# Patient Record
Sex: FEMALE | Race: OTHER | HISPANIC OR LATINO | URBAN - METROPOLITAN AREA
[De-identification: names, ages, dates, MRNs, and addresses within clinical notes are randomized per-mention and may not be internally consistent; named-entity substitution may affect disease eponyms.]

---

## 2017-05-21 ENCOUNTER — EMERGENCY (EMERGENCY)
Facility: HOSPITAL | Age: 77
LOS: 1 days | Discharge: HOME | End: 2017-05-21
Attending: EMERGENCY MEDICINE | Admitting: EMERGENCY MEDICINE
Payer: COMMERCIAL

## 2017-05-21 VITALS
TEMPERATURE: 98 F | HEART RATE: 62 BPM | RESPIRATION RATE: 18 BRPM | OXYGEN SATURATION: 98 % | WEIGHT: 142.64 LBS | SYSTOLIC BLOOD PRESSURE: 150 MMHG | DIASTOLIC BLOOD PRESSURE: 82 MMHG

## 2017-05-21 VITALS
RESPIRATION RATE: 20 BRPM | TEMPERATURE: 98 F | OXYGEN SATURATION: 98 % | HEART RATE: 50 BPM | DIASTOLIC BLOOD PRESSURE: 61 MMHG | SYSTOLIC BLOOD PRESSURE: 106 MMHG

## 2017-05-21 DIAGNOSIS — I25.10 ATHEROSCLEROTIC HEART DISEASE OF NATIVE CORONARY ARTERY WITHOUT ANGINA PECTORIS: ICD-10-CM

## 2017-05-21 DIAGNOSIS — Z98.89 OTHER SPECIFIED POSTPROCEDURAL STATES: Chronic | ICD-10-CM

## 2017-05-21 DIAGNOSIS — E78.00 PURE HYPERCHOLESTEROLEMIA, UNSPECIFIED: ICD-10-CM

## 2017-05-21 DIAGNOSIS — R51 HEADACHE: ICD-10-CM

## 2017-05-21 DIAGNOSIS — Z79.82 LONG TERM (CURRENT) USE OF ASPIRIN: ICD-10-CM

## 2017-05-21 DIAGNOSIS — Z79.899 OTHER LONG TERM (CURRENT) DRUG THERAPY: ICD-10-CM

## 2017-05-21 DIAGNOSIS — E03.9 HYPOTHYROIDISM, UNSPECIFIED: ICD-10-CM

## 2017-05-21 DIAGNOSIS — I10 ESSENTIAL (PRIMARY) HYPERTENSION: ICD-10-CM

## 2017-05-21 LAB
ALBUMIN SERPL ELPH-MCNC: 4.3 G/DL — SIGNIFICANT CHANGE UP (ref 3.3–5)
ALP SERPL-CCNC: 59 U/L — SIGNIFICANT CHANGE UP (ref 40–120)
ALT FLD-CCNC: 15 U/L — SIGNIFICANT CHANGE UP (ref 10–45)
ANION GAP SERPL CALC-SCNC: 12 MMOL/L — SIGNIFICANT CHANGE UP (ref 5–17)
AST SERPL-CCNC: 22 U/L — SIGNIFICANT CHANGE UP (ref 10–40)
BASOPHILS NFR BLD AUTO: 0.2 % — SIGNIFICANT CHANGE UP (ref 0–2)
BILIRUB SERPL-MCNC: 0.6 MG/DL — SIGNIFICANT CHANGE UP (ref 0.2–1.2)
BUN SERPL-MCNC: 16 MG/DL — SIGNIFICANT CHANGE UP (ref 7–23)
CALCIUM SERPL-MCNC: 9.6 MG/DL — SIGNIFICANT CHANGE UP (ref 8.4–10.5)
CHLORIDE SERPL-SCNC: 101 MMOL/L — SIGNIFICANT CHANGE UP (ref 96–108)
CO2 SERPL-SCNC: 24 MMOL/L — SIGNIFICANT CHANGE UP (ref 22–31)
CREAT SERPL-MCNC: 0.6 MG/DL — SIGNIFICANT CHANGE UP (ref 0.5–1.3)
EOSINOPHIL NFR BLD AUTO: 2.2 % — SIGNIFICANT CHANGE UP (ref 0–6)
GLUCOSE SERPL-MCNC: 170 MG/DL — HIGH (ref 70–99)
HCT VFR BLD CALC: 42 % — SIGNIFICANT CHANGE UP (ref 34.5–45)
HGB BLD-MCNC: 14.8 G/DL — SIGNIFICANT CHANGE UP (ref 11.5–15.5)
LYMPHOCYTES # BLD AUTO: 28.2 % — SIGNIFICANT CHANGE UP (ref 13–44)
MCHC RBC-ENTMCNC: 33.6 PG — SIGNIFICANT CHANGE UP (ref 27–34)
MCHC RBC-ENTMCNC: 35.2 G/DL — SIGNIFICANT CHANGE UP (ref 32–36)
MCV RBC AUTO: 95.2 FL — SIGNIFICANT CHANGE UP (ref 80–100)
MONOCYTES NFR BLD AUTO: 4 % — SIGNIFICANT CHANGE UP (ref 2–14)
NEUTROPHILS NFR BLD AUTO: 65.4 % — SIGNIFICANT CHANGE UP (ref 43–77)
PLATELET # BLD AUTO: 202 K/UL — SIGNIFICANT CHANGE UP (ref 150–400)
POTASSIUM SERPL-MCNC: 3.7 MMOL/L — SIGNIFICANT CHANGE UP (ref 3.5–5.3)
POTASSIUM SERPL-SCNC: 3.7 MMOL/L — SIGNIFICANT CHANGE UP (ref 3.5–5.3)
PROT SERPL-MCNC: 7.2 G/DL — SIGNIFICANT CHANGE UP (ref 6–8.3)
RBC # BLD: 4.41 M/UL — SIGNIFICANT CHANGE UP (ref 3.8–5.2)
RBC # FLD: 16.2 % — SIGNIFICANT CHANGE UP (ref 10.3–16.9)
SODIUM SERPL-SCNC: 137 MMOL/L — SIGNIFICANT CHANGE UP (ref 135–145)
WBC # BLD: 5.9 K/UL — SIGNIFICANT CHANGE UP (ref 3.8–10.5)
WBC # FLD AUTO: 5.9 K/UL — SIGNIFICANT CHANGE UP (ref 3.8–10.5)

## 2017-05-21 PROCEDURE — 99285 EMERGENCY DEPT VISIT HI MDM: CPT

## 2017-05-21 PROCEDURE — 80053 COMPREHEN METABOLIC PANEL: CPT

## 2017-05-21 PROCEDURE — 36415 COLL VENOUS BLD VENIPUNCTURE: CPT

## 2017-05-21 PROCEDURE — 70450 CT HEAD/BRAIN W/O DYE: CPT

## 2017-05-21 PROCEDURE — 85025 COMPLETE CBC W/AUTO DIFF WBC: CPT

## 2017-05-21 PROCEDURE — 96374 THER/PROPH/DIAG INJ IV PUSH: CPT

## 2017-05-21 PROCEDURE — 99284 EMERGENCY DEPT VISIT MOD MDM: CPT | Mod: 25

## 2017-05-21 PROCEDURE — 70450 CT HEAD/BRAIN W/O DYE: CPT | Mod: 26

## 2017-05-21 RX ORDER — ACETAMINOPHEN 500 MG
650 TABLET ORAL ONCE
Qty: 0 | Refills: 0 | Status: COMPLETED | OUTPATIENT
Start: 2017-05-21 | End: 2017-05-21

## 2017-05-21 RX ORDER — METOCLOPRAMIDE HCL 10 MG
10 TABLET ORAL ONCE
Qty: 0 | Refills: 0 | Status: COMPLETED | OUTPATIENT
Start: 2017-05-21 | End: 2017-05-21

## 2017-05-21 RX ORDER — SODIUM CHLORIDE 9 MG/ML
1000 INJECTION INTRAMUSCULAR; INTRAVENOUS; SUBCUTANEOUS ONCE
Qty: 0 | Refills: 0 | Status: COMPLETED | OUTPATIENT
Start: 2017-05-21 | End: 2017-05-21

## 2017-05-21 RX ADMIN — SODIUM CHLORIDE 2000 MILLILITER(S): 9 INJECTION INTRAMUSCULAR; INTRAVENOUS; SUBCUTANEOUS at 11:41

## 2017-05-21 RX ADMIN — Medication 104 MILLIGRAM(S): at 11:41

## 2017-05-21 RX ADMIN — Medication 650 MILLIGRAM(S): at 11:41

## 2017-05-21 NOTE — ED PROVIDER NOTE - ATTENDING CONTRIBUTION TO CARE
dull headache at occipital portion of head, n o trauma, gradual onset, nl neuro exam, well appearing, supple neck,no temporal tenderness,  no fevers, high suspicion for tension headache however due to age CT was done, no apparent ICH or lesion, pt's headache improved with treatment, stable for discharge.

## 2017-05-21 NOTE — ED PROVIDER NOTE - OBJECTIVE STATEMENT
MO 77 yo f w/ pmh cad, htn, hyperlipidemia, hypothyroidism [place] 75 yo f w/ pmh cad, htn, hyperlipidemia, hypothyroidism presents to ed for headache since yesterday.  Pt describes it as dull achiness to back of rt side of head without any alleviating/worsening factors.  pt states it has gotten progressively worse and that she has not taken anything for pain.  Pt has had similar headaches in the past.  Pt otherwise denies worst headache of her life, lightheadedness, dizziness, changes in vision, fevers, chills

## 2017-05-21 NOTE — ED ADULT NURSE NOTE - PMH
CAD (coronary artery disease)    Glaucoma    Herniated disc, cervical    HLD (hyperlipidemia)    HTN (hypertension)    Hypothyroid

## 2017-05-21 NOTE — ED ADULT TRIAGE NOTE - ARRIVAL INFO ADDITIONAL COMMENTS
Pt presented to ED with occipital headache since last night. Pt denies vomiting, dizziness, vision change, dizziness, chest pain, SOB, fever, cough. Pt is A&O x 3 and able to speak coherently in full sentences, all extremities are equally strong, no facial droop, no arm drift.

## 2017-05-21 NOTE — ED PROVIDER NOTE - MEDICAL DECISION MAKING DETAILS
Case d/w dr. Esquivel, [place] 75 yo f w/ pmh cad, htn, hyperlipidemia, hypothyroidism presents to ed for headache since yesterday.  Pt describes it as dull achiness to back of rt side of head without any alleviating/worsening factors. Pt with nonfocal neuro exam, pt likely with tension headache.  Ct head obtained and normal, basic labs checked and normal.  Pt treated with ivf's, reglan and tylenol, pt reassessed and reports feeling much improved, pt stable for discharge

## 2020-02-19 ENCOUNTER — EMERGENCY (EMERGENCY)
Facility: HOSPITAL | Age: 80
LOS: 1 days | Discharge: HOME | End: 2020-02-19
Attending: EMERGENCY MEDICINE | Admitting: EMERGENCY MEDICINE
Payer: COMMERCIAL

## 2020-02-19 VITALS
WEIGHT: 139.99 LBS | TEMPERATURE: 98 F | OXYGEN SATURATION: 96 % | DIASTOLIC BLOOD PRESSURE: 91 MMHG | SYSTOLIC BLOOD PRESSURE: 166 MMHG | HEART RATE: 66 BPM | RESPIRATION RATE: 18 BRPM | HEIGHT: 62 IN

## 2020-02-19 DIAGNOSIS — Z98.89 OTHER SPECIFIED POSTPROCEDURAL STATES: Chronic | ICD-10-CM

## 2020-02-19 PROCEDURE — 99283 EMERGENCY DEPT VISIT LOW MDM: CPT

## 2020-02-19 PROCEDURE — 99284 EMERGENCY DEPT VISIT MOD MDM: CPT

## 2020-02-19 RX ORDER — DIAZEPAM 5 MG
2 TABLET ORAL ONCE
Refills: 0 | Status: DISCONTINUED | OUTPATIENT
Start: 2020-02-19 | End: 2020-02-19

## 2020-02-19 RX ORDER — DIAZEPAM 5 MG
1 TABLET ORAL
Qty: 9 | Refills: 0
Start: 2020-02-19 | End: 2020-02-21

## 2020-02-19 RX ORDER — ACETAMINOPHEN 500 MG
650 TABLET ORAL ONCE
Refills: 0 | Status: COMPLETED | OUTPATIENT
Start: 2020-02-19 | End: 2020-02-19

## 2020-02-19 RX ADMIN — Medication 650 MILLIGRAM(S): at 17:21

## 2020-02-19 RX ADMIN — Medication 2 MILLIGRAM(S): at 17:21

## 2020-02-19 NOTE — ED PROVIDER NOTE - CLINICAL SUMMARY MEDICAL DECISION MAKING FREE TEXT BOX
79F PMH CAD w/ stent, HTN, HLD, hypothyroid, glaucoma, cervical stenosis p/w pain/dizziness. Pt has pain to L occipital head radiating inferiorly to L cervical region. Also feels intermittent sensation of room spinning. Both of these symptoms have been intermittent for yrs. Follows w/ neuro, but neuro  ~1mo ago so came to ED. Has been taking very sporadic flexeril and baclofen w/ minimal relief. Last MRI c-spine/brain in May 2019 w/ no acute pathology, multilevel severe cervical stenosis. Subsequent neuro note in  recommending continued vestibular exercises. Current symptoms have been present x1w. No current dizziness. Vitals wnl, exam as above.  ddx: Likely 2/2 chronic stenosis/radiculopathy. Clinically no acute intracranial/spinal pathology.   Symptom control, reassess.

## 2020-02-19 NOTE — ED PROVIDER NOTE - PROGRESS NOTE DETAILS
[place]: Feeling much better after tylenol/valium. No dizziness while in ED. Clinically no indication for further emergent ED workup or hospitalization at this time. Comfortable for dc, outpt f/u.

## 2020-02-19 NOTE — ED PROVIDER NOTE - PATIENT PORTAL LINK FT
You can access the FollowMyHealth Patient Portal offered by [facility] by registering at the following website: [email or web address]. By joining [employer]’s FollowMyHealth portal, you will also be able to view your health information using other applications (apps) compatible with our system.

## 2020-02-19 NOTE — ED ADULT TRIAGE NOTE - CHIEF COMPLAINT QUOTE
patient with hx of cervical stenosis complains of 4 days of neck pain. she states that she took flexeril last night with some relief. but her specialist passed away 1 month agoand she does not have someone new to see. denies numbness/tingling, dizziness.

## 2020-02-19 NOTE — ED ADULT NURSE NOTE - OBJECTIVE STATEMENT
patient with hx of cervical stenosis and vertigo for 10 years,  complains of 4 days of neck pain and dizziness. she states that she took flexeril last night with some relief. but her specialist passed away 1 month ago and she does not have someone new to see. denies numbness/tingling, visual changes, chest pain, sob or any other medical complaints. no neuro deficits noted upon examination.

## 2020-02-19 NOTE — ED PROVIDER NOTE - CARE PLAN
Principal Discharge DX:	Cervical stenosis of spine  Secondary Diagnosis:	Neck pain  Secondary Diagnosis:	Vertigo

## 2020-02-19 NOTE — ED ADULT NURSE NOTE - INTERVENTIONS DEFINITIONS
Physically safe environment: no spills, clutter or unnecessary equipment/Provide visual cue, wrist band, yellow gown, etc./[place] to call system/Instruct patient to call for assistance

## 2020-02-19 NOTE — ED PROVIDER NOTE - OBJECTIVE STATEMENT
Nepalese, prefers daughter at bedside to translate.   79F PMH CAD w/ stent, HTN, HLD, hypothyroid, glaucoma, cervical stenosis p/w pain/dizziness. Pt has pain to L occipital head radiating inferiorly to L cervical region. Also feels intermittent sensation of room spinning. Both of these symptoms have been intermittent for yrs. Follows w/ neuro, but neuro  ~1mo ago so came to ED. Has been taking very sporadic flexeril and baclofen w/ minimal relief. Last MRI c-spine/brain in May 2019 w/ no acute pathology, multilevel severe cervical stenosis. Subsequent neuro note in  recommending continued vestibular exercises. Current symptoms have been present x1w. Denies vision changes, focal weakness/numbness, rashes, tinnitus, hearing changes, URI symptoms, f/c, SOB/CP, NVD, abd pain, urinary complaints, black/bloody stool, dietary/medication changes. CMP from 20 w/ pt grossly wnl.

## 2020-02-19 NOTE — ED PROVIDER NOTE - PHYSICAL EXAMINATION
PERRL, EOMI, no nystagmus. CN intact. Strength 5/5. Normal F-->N, H-->S. Steady unassisted gait. No pronator drift. Sensation intact. No carotid bruits.   no LE edema, normal equal distal pulses.  No spinal ttp, neck FROM. Strength 5/5. No bony ttp, FROM all extremities. Normal equal distal pulses. Steady unassisted gait.

## 2020-02-25 DIAGNOSIS — E78.5 HYPERLIPIDEMIA, UNSPECIFIED: ICD-10-CM

## 2020-02-25 DIAGNOSIS — I25.10 ATHEROSCLEROTIC HEART DISEASE OF NATIVE CORONARY ARTERY WITHOUT ANGINA PECTORIS: ICD-10-CM

## 2020-02-25 DIAGNOSIS — Z91.048 OTHER NONMEDICINAL SUBSTANCE ALLERGY STATUS: ICD-10-CM

## 2020-02-25 DIAGNOSIS — I10 ESSENTIAL (PRIMARY) HYPERTENSION: ICD-10-CM

## 2020-02-25 DIAGNOSIS — R51 HEADACHE: ICD-10-CM

## 2020-02-25 DIAGNOSIS — M48.02 SPINAL STENOSIS, CERVICAL REGION: ICD-10-CM

## 2020-02-25 DIAGNOSIS — E03.9 HYPOTHYROIDISM, UNSPECIFIED: ICD-10-CM

## 2020-02-25 DIAGNOSIS — Z79.52 LONG TERM (CURRENT) USE OF SYSTEMIC STEROIDS: ICD-10-CM

## 2020-02-25 DIAGNOSIS — M54.2 CERVICALGIA: ICD-10-CM

## 2020-02-25 DIAGNOSIS — Z79.82 LONG TERM (CURRENT) USE OF ASPIRIN: ICD-10-CM

## 2020-02-25 DIAGNOSIS — Z79.899 OTHER LONG TERM (CURRENT) DRUG THERAPY: ICD-10-CM

## 2020-02-25 DIAGNOSIS — R42 DIZZINESS AND GIDDINESS: ICD-10-CM

## 2022-06-23 ENCOUNTER — EMERGENCY (EMERGENCY)
Facility: HOSPITAL | Age: 82
LOS: 1 days | Discharge: HOME | End: 2022-06-23
Attending: STUDENT IN AN ORGANIZED HEALTH CARE EDUCATION/TRAINING PROGRAM | Admitting: STUDENT IN AN ORGANIZED HEALTH CARE EDUCATION/TRAINING PROGRAM
Payer: COMMERCIAL

## 2022-06-23 VITALS
SYSTOLIC BLOOD PRESSURE: 163 MMHG | HEIGHT: 62 IN | OXYGEN SATURATION: 96 % | TEMPERATURE: 98 F | WEIGHT: 145.06 LBS | RESPIRATION RATE: 18 BRPM | HEART RATE: 66 BPM | DIASTOLIC BLOOD PRESSURE: 62 MMHG

## 2022-06-23 DIAGNOSIS — I10 ESSENTIAL (PRIMARY) HYPERTENSION: ICD-10-CM

## 2022-06-23 DIAGNOSIS — I25.10 ATHEROSCLEROTIC HEART DISEASE OF NATIVE CORONARY ARTERY WITHOUT ANGINA PECTORIS: ICD-10-CM

## 2022-06-23 DIAGNOSIS — Z79.82 LONG TERM (CURRENT) USE OF ASPIRIN: ICD-10-CM

## 2022-06-23 DIAGNOSIS — R07.89 OTHER CHEST PAIN: ICD-10-CM

## 2022-06-23 DIAGNOSIS — Z98.89 OTHER SPECIFIED POSTPROCEDURAL STATES: Chronic | ICD-10-CM

## 2022-06-23 DIAGNOSIS — Z86.16 PERSONAL HISTORY OF COVID-19: ICD-10-CM

## 2022-06-23 DIAGNOSIS — Z91.048 OTHER NONMEDICINAL SUBSTANCE ALLERGY STATUS: ICD-10-CM

## 2022-06-23 DIAGNOSIS — E78.5 HYPERLIPIDEMIA, UNSPECIFIED: ICD-10-CM

## 2022-06-23 DIAGNOSIS — Z95.5 PRESENCE OF CORONARY ANGIOPLASTY IMPLANT AND GRAFT: ICD-10-CM

## 2022-06-23 DIAGNOSIS — Z20.822 CONTACT WITH AND (SUSPECTED) EXPOSURE TO COVID-19: ICD-10-CM

## 2022-06-23 DIAGNOSIS — E03.9 HYPOTHYROIDISM, UNSPECIFIED: ICD-10-CM

## 2022-06-23 LAB
ALBUMIN SERPL ELPH-MCNC: 4.6 G/DL — SIGNIFICANT CHANGE UP (ref 3.3–5)
ALP SERPL-CCNC: 65 U/L — SIGNIFICANT CHANGE UP (ref 40–120)
ALT FLD-CCNC: 16 U/L — SIGNIFICANT CHANGE UP (ref 10–45)
ANION GAP SERPL CALC-SCNC: 12 MMOL/L — SIGNIFICANT CHANGE UP (ref 5–17)
AST SERPL-CCNC: 33 U/L — SIGNIFICANT CHANGE UP (ref 10–40)
BASOPHILS # BLD AUTO: 0.05 K/UL — SIGNIFICANT CHANGE UP (ref 0–0.2)
BASOPHILS NFR BLD AUTO: 0.8 % — SIGNIFICANT CHANGE UP (ref 0–2)
BILIRUB SERPL-MCNC: 0.6 MG/DL — SIGNIFICANT CHANGE UP (ref 0.2–1.2)
BUN SERPL-MCNC: 15 MG/DL — SIGNIFICANT CHANGE UP (ref 7–23)
CALCIUM SERPL-MCNC: 10 MG/DL — SIGNIFICANT CHANGE UP (ref 8.4–10.5)
CHLORIDE SERPL-SCNC: 106 MMOL/L — SIGNIFICANT CHANGE UP (ref 96–108)
CK MB CFR SERPL CALC: 2.5 NG/ML — SIGNIFICANT CHANGE UP (ref 0–6.7)
CK SERPL-CCNC: 67 U/L — SIGNIFICANT CHANGE UP (ref 25–170)
CO2 SERPL-SCNC: 24 MMOL/L — SIGNIFICANT CHANGE UP (ref 22–31)
CREAT SERPL-MCNC: 0.64 MG/DL — SIGNIFICANT CHANGE UP (ref 0.5–1.3)
D DIMER BLD IA.RAPID-MCNC: 427 NG/ML DDU — HIGH
EGFR: 89 ML/MIN/1.73M2 — SIGNIFICANT CHANGE UP
EOSINOPHIL # BLD AUTO: 0.16 K/UL — SIGNIFICANT CHANGE UP (ref 0–0.5)
EOSINOPHIL NFR BLD AUTO: 2.5 % — SIGNIFICANT CHANGE UP (ref 0–6)
GLUCOSE SERPL-MCNC: 97 MG/DL — SIGNIFICANT CHANGE UP (ref 70–99)
HCT VFR BLD CALC: 40.9 % — SIGNIFICANT CHANGE UP (ref 34.5–45)
HGB BLD-MCNC: 14.3 G/DL — SIGNIFICANT CHANGE UP (ref 11.5–15.5)
IMM GRANULOCYTES NFR BLD AUTO: 0.3 % — SIGNIFICANT CHANGE UP (ref 0–1.5)
LYMPHOCYTES # BLD AUTO: 2 K/UL — SIGNIFICANT CHANGE UP (ref 1–3.3)
LYMPHOCYTES # BLD AUTO: 31.7 % — SIGNIFICANT CHANGE UP (ref 13–44)
MCHC RBC-ENTMCNC: 33.6 PG — SIGNIFICANT CHANGE UP (ref 27–34)
MCHC RBC-ENTMCNC: 35 GM/DL — SIGNIFICANT CHANGE UP (ref 32–36)
MCV RBC AUTO: 96 FL — SIGNIFICANT CHANGE UP (ref 80–100)
MONOCYTES # BLD AUTO: 0.55 K/UL — SIGNIFICANT CHANGE UP (ref 0–0.9)
MONOCYTES NFR BLD AUTO: 8.7 % — SIGNIFICANT CHANGE UP (ref 2–14)
NEUTROPHILS # BLD AUTO: 3.52 K/UL — SIGNIFICANT CHANGE UP (ref 1.8–7.4)
NEUTROPHILS NFR BLD AUTO: 56 % — SIGNIFICANT CHANGE UP (ref 43–77)
NRBC # BLD: 0 /100 WBCS — SIGNIFICANT CHANGE UP (ref 0–0)
PLATELET # BLD AUTO: 301 K/UL — SIGNIFICANT CHANGE UP (ref 150–400)
POTASSIUM SERPL-MCNC: 3.9 MMOL/L — SIGNIFICANT CHANGE UP (ref 3.5–5.3)
POTASSIUM SERPL-SCNC: 3.9 MMOL/L — SIGNIFICANT CHANGE UP (ref 3.5–5.3)
PROT SERPL-MCNC: 7.3 G/DL — SIGNIFICANT CHANGE UP (ref 6–8.3)
RBC # BLD: 4.26 M/UL — SIGNIFICANT CHANGE UP (ref 3.8–5.2)
RBC # FLD: 16.9 % — HIGH (ref 10.3–14.5)
SARS-COV-2 RNA SPEC QL NAA+PROBE: NEGATIVE — SIGNIFICANT CHANGE UP
SODIUM SERPL-SCNC: 142 MMOL/L — SIGNIFICANT CHANGE UP (ref 135–145)
TROPONIN T SERPL-MCNC: 0.01 NG/ML — SIGNIFICANT CHANGE UP (ref 0–0.01)
WBC # BLD: 6.3 K/UL — SIGNIFICANT CHANGE UP (ref 3.8–10.5)
WBC # FLD AUTO: 6.3 K/UL — SIGNIFICANT CHANGE UP (ref 3.8–10.5)

## 2022-06-23 PROCEDURE — 84484 ASSAY OF TROPONIN QUANT: CPT

## 2022-06-23 PROCEDURE — 80053 COMPREHEN METABOLIC PANEL: CPT

## 2022-06-23 PROCEDURE — 93010 ELECTROCARDIOGRAM REPORT: CPT

## 2022-06-23 PROCEDURE — 99285 EMERGENCY DEPT VISIT HI MDM: CPT | Mod: 25

## 2022-06-23 PROCEDURE — 82553 CREATINE MB FRACTION: CPT

## 2022-06-23 PROCEDURE — 71046 X-RAY EXAM CHEST 2 VIEWS: CPT

## 2022-06-23 PROCEDURE — 71046 X-RAY EXAM CHEST 2 VIEWS: CPT | Mod: 26

## 2022-06-23 PROCEDURE — G1004: CPT

## 2022-06-23 PROCEDURE — 71275 CT ANGIOGRAPHY CHEST: CPT | Mod: ME

## 2022-06-23 PROCEDURE — 85379 FIBRIN DEGRADATION QUANT: CPT

## 2022-06-23 PROCEDURE — 93005 ELECTROCARDIOGRAM TRACING: CPT

## 2022-06-23 PROCEDURE — 82550 ASSAY OF CK (CPK): CPT

## 2022-06-23 PROCEDURE — 85025 COMPLETE CBC W/AUTO DIFF WBC: CPT

## 2022-06-23 PROCEDURE — 87635 SARS-COV-2 COVID-19 AMP PRB: CPT

## 2022-06-23 PROCEDURE — 36415 COLL VENOUS BLD VENIPUNCTURE: CPT

## 2022-06-23 PROCEDURE — 71275 CT ANGIOGRAPHY CHEST: CPT | Mod: 26,ME

## 2022-06-23 RX ORDER — ASPIRIN/CALCIUM CARB/MAGNESIUM 324 MG
162 TABLET ORAL ONCE
Refills: 0 | Status: COMPLETED | OUTPATIENT
Start: 2022-06-23 | End: 2022-06-23

## 2022-06-23 RX ADMIN — Medication 162 MILLIGRAM(S): at 19:59

## 2022-06-23 NOTE — ED ADULT TRIAGE NOTE - LOCATION:

## 2022-06-23 NOTE — ED PROVIDER NOTE - CLINICAL SUMMARY MEDICAL DECISION MAKING FREE TEXT BOX
80 yo female with a hx of CAD s/p PCI, HTN, HLD, hypothyroidism p/w 10 days of b/l chest pain. VS noted. Exam unremarkable. EKG with TWI in V4-6 new from prior here from 2016. Wells score low- d-dimer to rule out PE. CXR for pneumothorax. No clinical pneumonia.

## 2022-06-23 NOTE — ED PROVIDER NOTE - NSFOLLOWUPINSTRUCTIONS_ED_ALL_ED_FT
Please olno3ap up with your cardiologist as scheduled in a few days for re-evaluation.       Nonspecific Chest Pain, Adult      Chest pain is an uncomfortable, tight, or painful feeling in the chest. The pain can feel like a crushing, aching, or squeezing pressure. A person can feel a burning or tingling sensation. Chest pain can also be felt in your back, neck, jaw, shoulder, or arm. This pain can be worse when you move, sneeze, or take a deep breath.    Chest pain can be caused by a condition that is life-threatening. This must be treated right away. It can also be caused by something that is not life-threatening. If you have chest pain, it can be hard to know the difference, so it is important to get help right away to make sure that you do not have a serious condition.    Some life-threatening causes of chest pain include:  •Heart attack.      •A tear in the body's main blood vessel (aortic dissection).      •Inflammation around your heart (pericarditis).      •A problem in the lungs, such as a blood clot (pulmonary embolism) or a collapsed lung (pneumothorax).      Some non life-threatening causes of chest pain include:  •Heartburn.      •Anxiety or stress.      •Damage to the bones, muscles, and cartilage that make up your chest wall.      •Pneumonia or bronchitis.      •Shingles infection (varicella-zoster virus).      Your chest pain may come and go. It may also be constant. Your health care provider will do tests and other studies to find the cause of your pain. Treatment will depend on the cause of your chest pain.      Follow these instructions at home:    Medicines     •Take over-the-counter and prescription medicines only as told by your health care provider.      •If you were prescribed an antibiotic medicine, take it as told by your health care provider. Do not stop taking the antibiotic even if you start to feel better.      Activity     •Avoid any activities that cause chest pain.      • Do not lift anything that is heavier than 10 lb (4.5 kg), or the limit that you are told, until your health care provider says that it is safe.      •Rest as directed by your health care provider.       •Return to your normal activities only as told by your health care provider. Ask your health care provider what activities are safe for you.        Lifestyle       A plate along with examples of foods in a healthy diet.       Silhouette of a person sitting on the floor doing yoga.     • Do not use any products that contain nicotine or tobacco, such as cigarettes, e-cigarettes, and chewing tobacco. If you need help quitting, ask your health care provider.      • Do not drink alcohol.    •Make healthy lifestyle changes as recommended. These may include:  •Getting regular exercise. Ask your health care provider to suggest some exercises that are safe for you.      •Eating a heart-healthy diet. This includes plenty of fresh fruits and vegetables, whole grains, low-fat (lean) protein, and low-fat dairy products. A dietitian can help you find healthy eating options.      •Maintaining a healthy weight.      •Managing any other health conditions you may have, such as high blood pressure (hypertension) or diabetes.      •Reducing stress, such as with yoga or relaxation techniques.        General instructions     •Pay attention to any changes in your symptoms.      •It is up to you to get the results of any tests that were done. Ask your health care provider, or the department that is doing the tests, when your results will be ready.      •Keep all follow-up visits as told by your health care provider. This is important.       •You may be asked to go for further testing if your chest pain does not go away.        Contact a health care provider if:    •Your chest pain does not go away.      •You feel depressed.      •You have a fever.      •You notice changes in your symptoms or develop new symptoms.        Get help right away if:    •Your chest pain gets worse.      •You have a cough that gets worse, or you cough up blood.      •You have severe pain in your abdomen.      •You faint.      •You have sudden, unexplained chest discomfort.      •You have sudden, unexplained discomfort in your arms, back, neck, or jaw.      •You have shortness of breath at any time.      •You suddenly start to sweat, or your skin gets clammy.      •You feel nausea or you vomit.      •You suddenly feel lightheaded or dizzy.      •You have severe weakness, or unexplained weakness or fatigue.      •Your heart begins to beat quickly, or it feels like it is skipping beats.      These symptoms may represent a serious problem that is an emergency. Do not wait to see if the symptoms will go away. Get medical help right away. Call your local emergency services (911 in the [place] Do not drive yourself to the hospital.       Summary    •Chest pain can be caused by a condition that is serious and requires urgent treatment. It may also be caused by something that is not life-threatening.      •Your health care provider may do lab tests and other studies to find the cause of your pain.      •Follow your health care provider's instructions on taking medicines, making lifestyle changes, and getting emergency treatment if symptoms become worse.      •Keep all follow-up visits as told by your health care provider. This includes visits for any further testing if your chest pain does not go away.      This information is not intended to replace advice given to you by your health care provider. Make sure you discuss any questions you have with your health care provider.

## 2022-06-23 NOTE — ED PROVIDER NOTE - OBJECTIVE STATEMENT
80 yo female with a hx of CAD s/p PCI, HTN, HLD, hypothyroidism 82 yo female with a hx of CAD s/p PCI, HTN, HLD, hypothyroidism p/w 10 days of b/l chest pain. Mild, non radiating, intermittent then constant today, non exertional, without alleviating/aggravating factors. Diagnosed with COVID ~3 weeks ago. Vaccinated and boosted. Recent travel from [place]. No leg swelling/pain, fevers, chills, cough, shortness of breath, abdominal pain, n/v/d/c, dizziness, syncope, palpitations. No hx of DVT/PE. Takes daily ASA. Follows up with cardiologist Dr. [place] Rose at [facility]. No smoking, drugs or EtOH.

## 2022-06-23 NOTE — ED ADULT NURSE NOTE - OBJECTIVE STATEMENT
Pt is an 82 y/o female A&Ox4 in NAD ambulatory with steady gait c/o chest pain and SOB. Pt denies N/V/D. Pt talking in clear, full sentences, respirations even and unlabored, EKG done and signed.

## 2022-06-23 NOTE — ED PROVIDER NOTE - PROGRESS NOTE DETAILS
Age adjusted d-dimer positive. Pending CTA. results discussed with pt. 2 troponins are negative, no PE, no pneumonia, pt is pain free and asymptomatic while in the ER. Pt has an appointment with her cardiologist in 3 days. seeking discharge home. returned precautions discussed.

## 2022-06-23 NOTE — ED PROVIDER NOTE - PATIENT PORTAL LINK FT
You can access the FollowMyHealth Patient Portal offered by [facility] by registering at the following website: [email or web address]. By joining [employer]’s FollowMyHealth portal, you will also be able to view your health information using other applications (apps) compatible with our system.

## 2022-06-23 NOTE — ED ADULT NURSE NOTE - NSICDXPASTMEDICALHX_GEN_ALL_CORE_FT
PAST MEDICAL HISTORY:  CAD (coronary artery disease)     Glaucoma     Herniated disc, cervical     HLD (hyperlipidemia)     HTN (hypertension)     Hypothyroid

## 2022-06-23 NOTE — ED PROVIDER NOTE - IV ALTEPLASE INCLUSION HIDDEN
Specimen #  1   , Station  10 R    , Air fixed to frozen Pass 1, 2, 3, 4                                                95% ETOH fixed, Pass 1, 2, 3, 4                                                All passes in formalin for cell block  7.8 cm   show

## 2022-06-24 VITALS
RESPIRATION RATE: 16 BRPM | OXYGEN SATURATION: 98 % | SYSTOLIC BLOOD PRESSURE: 150 MMHG | TEMPERATURE: 98 F | DIASTOLIC BLOOD PRESSURE: 72 MMHG | HEART RATE: 62 BPM

## 2023-11-03 ENCOUNTER — EMERGENCY (EMERGENCY)
Facility: HOSPITAL | Age: 83
LOS: 1 days | Discharge: HOME | End: 2023-11-03
Attending: STUDENT IN AN ORGANIZED HEALTH CARE EDUCATION/TRAINING PROGRAM | Admitting: STUDENT IN AN ORGANIZED HEALTH CARE EDUCATION/TRAINING PROGRAM
Payer: COMMERCIAL

## 2023-11-03 VITALS
RESPIRATION RATE: 19 BRPM | HEART RATE: 64 BPM | WEIGHT: 126.1 LBS | SYSTOLIC BLOOD PRESSURE: 173 MMHG | HEIGHT: 62 IN | DIASTOLIC BLOOD PRESSURE: 105 MMHG | OXYGEN SATURATION: 98 % | TEMPERATURE: 98 F

## 2023-11-03 VITALS
RESPIRATION RATE: 17 BRPM | OXYGEN SATURATION: 100 % | DIASTOLIC BLOOD PRESSURE: 77 MMHG | HEART RATE: 77 BPM | SYSTOLIC BLOOD PRESSURE: 116 MMHG | TEMPERATURE: 98 F

## 2023-11-03 DIAGNOSIS — F43.23 ADJUSTMENT DISORDER WITH MIXED ANXIETY AND DEPRESSED MOOD: ICD-10-CM

## 2023-11-03 DIAGNOSIS — Z98.89 OTHER SPECIFIED POSTPROCEDURAL STATES: Chronic | ICD-10-CM

## 2023-11-03 LAB
ALBUMIN SERPL ELPH-MCNC: 4.4 G/DL — SIGNIFICANT CHANGE UP (ref 3.3–5)
ALBUMIN SERPL ELPH-MCNC: 4.4 G/DL — SIGNIFICANT CHANGE UP (ref 3.3–5)
ALP SERPL-CCNC: 74 U/L — SIGNIFICANT CHANGE UP (ref 40–120)
ALP SERPL-CCNC: 74 U/L — SIGNIFICANT CHANGE UP (ref 40–120)
ALT FLD-CCNC: 19 U/L — SIGNIFICANT CHANGE UP (ref 10–45)
ALT FLD-CCNC: 19 U/L — SIGNIFICANT CHANGE UP (ref 10–45)
ANION GAP SERPL CALC-SCNC: 10 MMOL/L — SIGNIFICANT CHANGE UP (ref 5–17)
ANION GAP SERPL CALC-SCNC: 10 MMOL/L — SIGNIFICANT CHANGE UP (ref 5–17)
APAP SERPL-MCNC: <5 UG/ML — LOW (ref 10–30)
APAP SERPL-MCNC: <5 UG/ML — LOW (ref 10–30)
AST SERPL-CCNC: 25 U/L — SIGNIFICANT CHANGE UP (ref 10–40)
AST SERPL-CCNC: 25 U/L — SIGNIFICANT CHANGE UP (ref 10–40)
BASOPHILS # BLD AUTO: 0.04 K/UL — SIGNIFICANT CHANGE UP (ref 0–0.2)
BASOPHILS # BLD AUTO: 0.04 K/UL — SIGNIFICANT CHANGE UP (ref 0–0.2)
BASOPHILS NFR BLD AUTO: 0.7 % — SIGNIFICANT CHANGE UP (ref 0–2)
BASOPHILS NFR BLD AUTO: 0.7 % — SIGNIFICANT CHANGE UP (ref 0–2)
BILIRUB SERPL-MCNC: 0.7 MG/DL — SIGNIFICANT CHANGE UP (ref 0.2–1.2)
BILIRUB SERPL-MCNC: 0.7 MG/DL — SIGNIFICANT CHANGE UP (ref 0.2–1.2)
BUN SERPL-MCNC: 22 MG/DL — SIGNIFICANT CHANGE UP (ref 7–23)
BUN SERPL-MCNC: 22 MG/DL — SIGNIFICANT CHANGE UP (ref 7–23)
CALCIUM SERPL-MCNC: 10.3 MG/DL — SIGNIFICANT CHANGE UP (ref 8.4–10.5)
CALCIUM SERPL-MCNC: 10.3 MG/DL — SIGNIFICANT CHANGE UP (ref 8.4–10.5)
CHLORIDE SERPL-SCNC: 105 MMOL/L — SIGNIFICANT CHANGE UP (ref 96–108)
CHLORIDE SERPL-SCNC: 105 MMOL/L — SIGNIFICANT CHANGE UP (ref 96–108)
CO2 SERPL-SCNC: 24 MMOL/L — SIGNIFICANT CHANGE UP (ref 22–31)
CO2 SERPL-SCNC: 24 MMOL/L — SIGNIFICANT CHANGE UP (ref 22–31)
CREAT SERPL-MCNC: 0.63 MG/DL — SIGNIFICANT CHANGE UP (ref 0.5–1.3)
CREAT SERPL-MCNC: 0.63 MG/DL — SIGNIFICANT CHANGE UP (ref 0.5–1.3)
EGFR: 89 ML/MIN/1.73M2 — SIGNIFICANT CHANGE UP
EGFR: 89 ML/MIN/1.73M2 — SIGNIFICANT CHANGE UP
EOSINOPHIL # BLD AUTO: 0.06 K/UL — SIGNIFICANT CHANGE UP (ref 0–0.5)
EOSINOPHIL # BLD AUTO: 0.06 K/UL — SIGNIFICANT CHANGE UP (ref 0–0.5)
EOSINOPHIL NFR BLD AUTO: 1.1 % — SIGNIFICANT CHANGE UP (ref 0–6)
EOSINOPHIL NFR BLD AUTO: 1.1 % — SIGNIFICANT CHANGE UP (ref 0–6)
ETHANOL SERPL-MCNC: <10 MG/DL — SIGNIFICANT CHANGE UP (ref 0–10)
ETHANOL SERPL-MCNC: <10 MG/DL — SIGNIFICANT CHANGE UP (ref 0–10)
GLUCOSE SERPL-MCNC: 123 MG/DL — HIGH (ref 70–99)
GLUCOSE SERPL-MCNC: 123 MG/DL — HIGH (ref 70–99)
HCT VFR BLD CALC: 43.7 % — SIGNIFICANT CHANGE UP (ref 34.5–45)
HCT VFR BLD CALC: 43.7 % — SIGNIFICANT CHANGE UP (ref 34.5–45)
HGB BLD-MCNC: 15.5 G/DL — SIGNIFICANT CHANGE UP (ref 11.5–15.5)
HGB BLD-MCNC: 15.5 G/DL — SIGNIFICANT CHANGE UP (ref 11.5–15.5)
IMM GRANULOCYTES NFR BLD AUTO: 0.2 % — SIGNIFICANT CHANGE UP (ref 0–0.9)
IMM GRANULOCYTES NFR BLD AUTO: 0.2 % — SIGNIFICANT CHANGE UP (ref 0–0.9)
LIDOCAIN IGE QN: 59 U/L — SIGNIFICANT CHANGE UP (ref 7–60)
LIDOCAIN IGE QN: 59 U/L — SIGNIFICANT CHANGE UP (ref 7–60)
LYMPHOCYTES # BLD AUTO: 1.4 K/UL — SIGNIFICANT CHANGE UP (ref 1–3.3)
LYMPHOCYTES # BLD AUTO: 1.4 K/UL — SIGNIFICANT CHANGE UP (ref 1–3.3)
LYMPHOCYTES # BLD AUTO: 26.2 % — SIGNIFICANT CHANGE UP (ref 13–44)
LYMPHOCYTES # BLD AUTO: 26.2 % — SIGNIFICANT CHANGE UP (ref 13–44)
MCHC RBC-ENTMCNC: 33.8 PG — SIGNIFICANT CHANGE UP (ref 27–34)
MCHC RBC-ENTMCNC: 33.8 PG — SIGNIFICANT CHANGE UP (ref 27–34)
MCHC RBC-ENTMCNC: 35.5 GM/DL — SIGNIFICANT CHANGE UP (ref 32–36)
MCHC RBC-ENTMCNC: 35.5 GM/DL — SIGNIFICANT CHANGE UP (ref 32–36)
MCV RBC AUTO: 95.2 FL — SIGNIFICANT CHANGE UP (ref 80–100)
MCV RBC AUTO: 95.2 FL — SIGNIFICANT CHANGE UP (ref 80–100)
MONOCYTES # BLD AUTO: 0.41 K/UL — SIGNIFICANT CHANGE UP (ref 0–0.9)
MONOCYTES # BLD AUTO: 0.41 K/UL — SIGNIFICANT CHANGE UP (ref 0–0.9)
MONOCYTES NFR BLD AUTO: 7.7 % — SIGNIFICANT CHANGE UP (ref 2–14)
MONOCYTES NFR BLD AUTO: 7.7 % — SIGNIFICANT CHANGE UP (ref 2–14)
NEUTROPHILS # BLD AUTO: 3.43 K/UL — SIGNIFICANT CHANGE UP (ref 1.8–7.4)
NEUTROPHILS # BLD AUTO: 3.43 K/UL — SIGNIFICANT CHANGE UP (ref 1.8–7.4)
NEUTROPHILS NFR BLD AUTO: 64.1 % — SIGNIFICANT CHANGE UP (ref 43–77)
NEUTROPHILS NFR BLD AUTO: 64.1 % — SIGNIFICANT CHANGE UP (ref 43–77)
NRBC # BLD: 0 /100 WBCS — SIGNIFICANT CHANGE UP (ref 0–0)
NRBC # BLD: 0 /100 WBCS — SIGNIFICANT CHANGE UP (ref 0–0)
PLATELET # BLD AUTO: 221 K/UL — SIGNIFICANT CHANGE UP (ref 150–400)
PLATELET # BLD AUTO: 221 K/UL — SIGNIFICANT CHANGE UP (ref 150–400)
POTASSIUM SERPL-MCNC: 3.9 MMOL/L — SIGNIFICANT CHANGE UP (ref 3.5–5.3)
POTASSIUM SERPL-MCNC: 3.9 MMOL/L — SIGNIFICANT CHANGE UP (ref 3.5–5.3)
POTASSIUM SERPL-SCNC: 3.9 MMOL/L — SIGNIFICANT CHANGE UP (ref 3.5–5.3)
POTASSIUM SERPL-SCNC: 3.9 MMOL/L — SIGNIFICANT CHANGE UP (ref 3.5–5.3)
PROT SERPL-MCNC: 7.7 G/DL — SIGNIFICANT CHANGE UP (ref 6–8.3)
PROT SERPL-MCNC: 7.7 G/DL — SIGNIFICANT CHANGE UP (ref 6–8.3)
RBC # BLD: 4.59 M/UL — SIGNIFICANT CHANGE UP (ref 3.8–5.2)
RBC # BLD: 4.59 M/UL — SIGNIFICANT CHANGE UP (ref 3.8–5.2)
RBC # FLD: 16.6 % — HIGH (ref 10.3–14.5)
RBC # FLD: 16.6 % — HIGH (ref 10.3–14.5)
SALICYLATES SERPL-MCNC: <0.3 MG/DL — LOW (ref 2.8–20)
SALICYLATES SERPL-MCNC: <0.3 MG/DL — LOW (ref 2.8–20)
SODIUM SERPL-SCNC: 139 MMOL/L — SIGNIFICANT CHANGE UP (ref 135–145)
SODIUM SERPL-SCNC: 139 MMOL/L — SIGNIFICANT CHANGE UP (ref 135–145)
TROPONIN T, HIGH SENSITIVITY RESULT: 10 NG/L — SIGNIFICANT CHANGE UP (ref 0–51)
TROPONIN T, HIGH SENSITIVITY RESULT: 10 NG/L — SIGNIFICANT CHANGE UP (ref 0–51)
TROPONIN T, HIGH SENSITIVITY RESULT: 6 NG/L — SIGNIFICANT CHANGE UP (ref 0–51)
TROPONIN T, HIGH SENSITIVITY RESULT: 6 NG/L — SIGNIFICANT CHANGE UP (ref 0–51)
TSH SERPL-MCNC: 3.15 UIU/ML — SIGNIFICANT CHANGE UP (ref 0.27–4.2)
TSH SERPL-MCNC: 3.15 UIU/ML — SIGNIFICANT CHANGE UP (ref 0.27–4.2)
WBC # BLD: 5.35 K/UL — SIGNIFICANT CHANGE UP (ref 3.8–10.5)
WBC # BLD: 5.35 K/UL — SIGNIFICANT CHANGE UP (ref 3.8–10.5)
WBC # FLD AUTO: 5.35 K/UL — SIGNIFICANT CHANGE UP (ref 3.8–10.5)
WBC # FLD AUTO: 5.35 K/UL — SIGNIFICANT CHANGE UP (ref 3.8–10.5)

## 2023-11-03 PROCEDURE — 96375 TX/PRO/DX INJ NEW DRUG ADDON: CPT

## 2023-11-03 PROCEDURE — 85025 COMPLETE CBC W/AUTO DIFF WBC: CPT

## 2023-11-03 PROCEDURE — 36415 COLL VENOUS BLD VENIPUNCTURE: CPT

## 2023-11-03 PROCEDURE — 80053 COMPREHEN METABOLIC PANEL: CPT

## 2023-11-03 PROCEDURE — 80307 DRUG TEST PRSMV CHEM ANLYZR: CPT

## 2023-11-03 PROCEDURE — 93005 ELECTROCARDIOGRAM TRACING: CPT

## 2023-11-03 PROCEDURE — 96374 THER/PROPH/DIAG INJ IV PUSH: CPT

## 2023-11-03 PROCEDURE — 83690 ASSAY OF LIPASE: CPT

## 2023-11-03 PROCEDURE — 90792 PSYCH DIAG EVAL W/MED SRVCS: CPT

## 2023-11-03 PROCEDURE — 71045 X-RAY EXAM CHEST 1 VIEW: CPT | Mod: 26

## 2023-11-03 PROCEDURE — 99285 EMERGENCY DEPT VISIT HI MDM: CPT

## 2023-11-03 PROCEDURE — 99285 EMERGENCY DEPT VISIT HI MDM: CPT | Mod: 25

## 2023-11-03 PROCEDURE — 93010 ELECTROCARDIOGRAM REPORT: CPT

## 2023-11-03 PROCEDURE — 71045 X-RAY EXAM CHEST 1 VIEW: CPT

## 2023-11-03 PROCEDURE — 84443 ASSAY THYROID STIM HORMONE: CPT

## 2023-11-03 PROCEDURE — 84484 ASSAY OF TROPONIN QUANT: CPT

## 2023-11-03 RX ORDER — FAMOTIDINE 10 MG/ML
20 INJECTION INTRAVENOUS ONCE
Refills: 0 | Status: COMPLETED | OUTPATIENT
Start: 2023-11-03 | End: 2023-11-03

## 2023-11-03 RX ORDER — SODIUM CHLORIDE 9 MG/ML
1000 INJECTION INTRAMUSCULAR; INTRAVENOUS; SUBCUTANEOUS ONCE
Refills: 0 | Status: COMPLETED | OUTPATIENT
Start: 2023-11-03 | End: 2023-11-03

## 2023-11-03 RX ORDER — PANTOPRAZOLE SODIUM 20 MG/1
40 TABLET, DELAYED RELEASE ORAL ONCE
Refills: 0 | Status: COMPLETED | OUTPATIENT
Start: 2023-11-03 | End: 2023-11-03

## 2023-11-03 RX ORDER — LABETALOL HCL 100 MG
10 TABLET ORAL ONCE
Refills: 0 | Status: COMPLETED | OUTPATIENT
Start: 2023-11-03 | End: 2023-11-03

## 2023-11-03 RX ADMIN — Medication 10 MILLIGRAM(S): at 13:35

## 2023-11-03 RX ADMIN — SODIUM CHLORIDE 1000 MILLILITER(S): 9 INJECTION INTRAMUSCULAR; INTRAVENOUS; SUBCUTANEOUS at 11:28

## 2023-11-03 RX ADMIN — FAMOTIDINE 20 MILLIGRAM(S): 10 INJECTION INTRAVENOUS at 11:28

## 2023-11-03 RX ADMIN — PANTOPRAZOLE SODIUM 40 MILLIGRAM(S): 20 TABLET, DELAYED RELEASE ORAL at 11:28

## 2023-11-03 NOTE — ED BEHAVIORAL HEALTH ASSESSMENT NOTE - HPI (INCLUDE ILLNESS QUALITY, SEVERITY, DURATION, TIMING, CONTEXT, MODIFYING FACTORS, ASSOCIATED SIGNS AND SYMPTOMS)
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 Pt reported feeling overwhelmed, with impaired coping with pain, poor sleep, occasional thoughts of jumping into traffic to relieve suffering, no reported intent or attempts. Psychiatry consulted for evaluation of anxiety, mood and SI.    On interview with , pt found awake, anxious-appearing, immediately providing a list of somatic complaints, worries about her medications and potential side effects, with unclear timeline of sx. SHe reports that between 2 weeks and 2 days ago, she began to experience abdominal/chest pain with an associated feeling of "despair" that made her consider "running into traffic" due to the level of distress. She states that she associated the feeling with a new eye drop prescribed for glaucoma so wondered if it might be a medication side effect. She also reports new anhedonia, low energy; denies hopelessness or guilt, denies disrupted appetite, denies paranoia or hallucinations. She denies any current SI, denies ever any recent intent to her life or attempts to end of life, and cites her Orthodox beliefs and family as protective factors. Denies ever any HI or violence. Chitra alcohol or drug use. Denies subjective memory loss. She repeatedly states that she just want to receive "vitamins" by IV in order to feel better.    Pt reports a distant psychiatric history of depression after the death of her  33 years ago, treated OP in [facility] with unknown medications. She reports a self-aborted suicide attempt by OD at that time, sought ED evaluation and was discharged. She denies any recent psychiatric care, with hydroxyzine prescribed by her PCP for anxiety and insomnia (takes 1-2x/day).    EMR reviewed - no prior evaluations by psychiatry seen.    Pt's daughter Radha contacted for collateral information with pt's consent by medical student Bambi Dalton:  Daughter states that her mother has been anxious and depressed ever since the death of her  (daughter's step father) 33 years ago. In last month, however, patient has become more anxious and is often sad, crying for no apparent reason at least 2-3x/week. In the last 1-2 months, she also apparently has had trouble remembering important dates - including her own birthday - and names like the name of the president. She said she also normally likes to travel to [place] to see her grandchildren, but right now doesn't want to travel anywhere (unclear if due to mood or need to stay in [place] for medical follow up).      Daughter could not identify any immediate triggers although said that her cousin passed away a year ago which was particularly hard for pt. As far as the last month goes, the daughter suspects that the medications her mother takes are contributing to her anxiety. She says she is on "a lot" of medications at home including recently a medication to help her sleep and new eye drops (couldn't specify)    No safety concerns at home. Patient lives with her daughter and daughter's boyfriend. She is alone during the daytime while her daughter works and is able to function well.

## 2023-11-03 NOTE — ED PROVIDER NOTE - OBJECTIVE STATEMENT
82 year old female with history of HTN, HLD, hypothyroidism, CAD, presenting with 82 year old female with history of HTN, HLD, hypothyroidism, CAD, presenting with epigastric pain x 3-4d. Patient states having unrelenting epigastric pain that radiates throughout her "whole body ", has been unable to sleep for the past few nights due to discomfort, also states that she feels anxious, recently had thyroid nodule biopsied and does not know results yet.  Did not take her blood pressure medications today because she wanted to come here for evaluation but has noticed elevated blood pressure at home as well.  Had an EGD in May showing gastritis.  Did not take any meds prior to arrival.  No fevers, chills, shortness of breath, nausea, vomiting, bloody stools, diarrhea.  Denies SI but feels very anxious regarding her persistent pain which is intolerable at times.     ID: [id number] 82 year old female with history of HTN, HLD, hypothyroidism, CAD, presenting with epigastric pain x 3-4d. Patient states having unrelenting epigastric pain that radiates throughout her "whole body ", has been unable to sleep for the past few nights due to discomfort, also states that she feels anxious, recently had thyroid nodule biopsied and does not know results yet.  Did not take her blood pressure medications today because she wanted to come here for evaluation but has noticed elevated blood pressure at home as well.  Had an EGD in May showing gastritis.  Did not take any meds prior to arrival.  No fevers, chills, shortness of breath, nausea, vomiting, bloody stools, diarrhea.  Denies SI but feels very anxious regarding her persistent pain which is intolerable at times, feels unable to cope with it/overwhelmed.      ID: [id number] 82 year old female with history of HTN, HLD, hypothyroidism, CAD, presenting with epigastric pain x 3-4d. Patient states having unrelenting epigastric pain that radiates throughout her "whole body ", has been unable to sleep for the past few nights due to discomfort, also states that she feels anxious, recently had thyroid nodule biopsied and does not know results yet.  Did not take her blood pressure medications today because she wanted to come here for evaluation but has noticed elevated blood pressure at home as well.  Had an EGD in May showing gastritis.  Did not take any meds prior to arrival.  No fevers, chills, shortness of breath, nausea, vomiting, bloody stools, diarrhea.  Denies SI but feels very anxious regarding her persistent pain which is intolerable at times, feels unable to cope with it/overwhelmed. Has f/u appt with her cardiologist on Monday for a "monitor"     ID: [id number]

## 2023-11-03 NOTE — ED ADULT TRIAGE NOTE - CHIEF COMPLAINT QUOTE
Pt walked into ED c/o multiple medical issues. pt states "I had a thyroid test 2 weeks ago that I havent gotten the results of, my blood pressure is up, i'm having a lot of stomach pain, and I've had such bad anxiety I can barely sleep." Denies CP, SOB, fevers,

## 2023-11-03 NOTE — ED BEHAVIORAL HEALTH ASSESSMENT NOTE - OTHER PAST PSYCHIATRIC HISTORY (INCLUDE DETAILS REGARDING ONSET, COURSE OF ILLNESS, INPATIENT/OUTPATIENT TREATMENT)
Pt reports dx of depression after the death of her  33 years ago, reports self-aborted suicide attempt by OD on pills, reports ED evaluation but no psychiatric admission (in [facility]). Reports h/o treatment with medication, unable to recall names. No current psychiatric care, with hydroxyzine PRN prescribed by PCP for ?anxiety and ?insomnia.

## 2023-11-03 NOTE — ED ADULT NURSE NOTE - OBJECTIVE STATEMENT
82y Female A&ox3 Czech speaking.  utilized. To ed C/O epigastric pain x4 days. Assocated with insomnia and anxiety. Pt denies si/ hi, fever, chills, n/v/d, sob, nor gu complaints. Endorses s/p thyroid nodule biopsied without results.

## 2023-11-03 NOTE — ED PROVIDER NOTE - PHYSICAL EXAMINATION
Gen - NAD; well-appearing but anxious; A+Ox3   HEENT - NCAT, EOMI, moist mucous membranes, clear oropharynx  Neck - supple  Resp - CTAB, no increased WOB  CV -  RRR, no m/r/g  Abd - soft, NT, ND; no guarding or rebound  Back - no CVA tenderness  MSK - FROM of b/l UE and LE, no gross deformities  Extrem - no LE edema/erythema/tenderness  Neuro - no focal motor or sensation deficits  Skin - warm, well perfused

## 2023-11-03 NOTE — ED BEHAVIORAL HEALTH ASSESSMENT NOTE - RISK ASSESSMENT
Assessed at low acute suicide and violence risk. No current SI or HI, no h/o violence, though has been experiencing recnet worsening mood and anxiety sx with intermittent SI without intent.  Static risk factors include h/o depression, h/o SI with self-aborted suicide attempt, advanced age  Modifiable risk factors include acute medical problems including pain, lack of engagement in mental health treatment  protective factors include Episcopalian beliefs, family relationships, future orientation, treatment seeking

## 2023-11-03 NOTE — ED PROVIDER NOTE - PATIENT PORTAL LINK FT
You can access the FollowMyHealth Patient Portal offered by [facility] by registering at the following website: [email or web address]. By joining [employer]’s FollowMyHealth portal, you will also be able to view your health information using other applications (apps) compatible with our system.

## 2023-11-03 NOTE — ED BEHAVIORAL HEALTH ASSESSMENT NOTE - DETAILS
Pt reports concern for SI worsened with use of ?eye drops lack of privacy in ED environment Please see HPI. Pt reports distant h/o of aborted suicide attempt by OD >30 years ago after 's death c/o epigastric pain self not at elevated acute risk. Emergency options, referral options for OP care reviewed

## 2023-11-03 NOTE — ED PROVIDER NOTE - CLINICAL SUMMARY MEDICAL DECISION MAKING FREE TEXT BOX
82 year old female with history of HTN, HLD, hypothyroidism, CAD, presenting with epigastric pain x 3-4d. 82 year old female with history of HTN, HLD, hypothyroidism, CAD, presenting with epigastric pain x 3-4d. Overall well appearing here but very labile on my evaluation, tearful at times, anxious, no active SI but feels unable to cope with her symptoms and having difficulty with sleeping. Will need ACS r/o given advanced age and risk factors but suspect symptomology likely 2/2 to known gastritis (reviewed EGD results from May, +gastritis), patient not currently on any PPI. Will trial GI cocktail for pain. Feel that patient would also greatly benefit from seeing psych, although likely would need medical admission for definitive pain control/ACS r/o if not improved with ED work up/management.

## 2023-11-03 NOTE — ED PROVIDER NOTE - PROGRESS NOTE DETAILS
Gino Aguilera MD: Patient reassessed, resting comfortably, states she does not have any pain currently. Pending psych eval. Discussed reassuring labwork thus far, pending rpt trop. Gino Aguilera MD: Patient reassessed, resting comfortably, states she does not have any pain currently. Pending psych recs. Discussed reassuring labwork thus far, pending rpt trop. Gino Aguilera MD: Patient cleared by psych, rtrop reassuring--2x trop wnl, no active symptoms. BP noted to be elevated--will give labetalol dose, reassess/repeat. Anticipate dc with improved BP, patient counseled to take her BP meds at home. Has f/u with cardiologist on Monday. Strict return precautions given. Gino Aguilera MD: Rpt BP improved. DC as above.

## 2023-11-03 NOTE — ED BEHAVIORAL HEALTH ASSESSMENT NOTE - NSBHMSETHTCONTENT_PSY_A_CORE
focused on somatic complaints, anxiety, worry about health. no voiced delusions or paranoia, no current SI or HI
This was a shared visit with the AYSE. I reviewed and verified the documentation and independently performed the documented:

## 2023-11-03 NOTE — ED BEHAVIORAL HEALTH ASSESSMENT NOTE - SUMMARY
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 Symptoms include low mood, low energy, decreased interest, high anxiety, disrupted sleep, and intermittent passive SI, never any intent, with many protective factors include relationship with family and Baptist beliefs. No s/s suggestive of roni or psychosis. Collateral from daughter suggests chronic h/o depression/anxiety with worsening sx over months, new short-term memory loss, no acute safety concerns.     At this time, pt would benefit from additional medical w/u as per ED and coordination of care with her PCP. Pt would also benefit from establishing OP psychiatric care for psychotherapy and med management of depression and anxiety. Currently pt is assessed at low acute risk of harm to self and others; no indication for IP level psychiatric care.    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 Symptoms include low mood, low energy, decreased interest, high anxiety, disrupted sleep, and intermittent passive SI, never any intent, with many protective factors include relationship with family and Confucianist beliefs. No s/s suggestive of roni or psychosis. Collateral from daughter suggests chronic h/o depression/anxiety with worsening sx over months, new short-term memory loss, no acute safety concerns.     At this time, pt would benefit from additional medical w/u as per ED and coordination of care with her PCP. Pt would also benefit from establishing OP psychiatric care for psychotherapy and med management of depression and anxiety. Currently pt is assessed at low acute risk of harm to self and others; no indication for IP level psychiatric care.    RECOMMENDATIONS  -pt is psychiatrically cleared for discharge  -encourage establishment of OP psychiatric f/u for psychotherapy and possible med management - referral options below  -collateral hx obtained from daughter and recommendations reviewed  -medical w/u as per ED  -ok to continue hydroxyzine PRN for anxiety pending OP assessment  -consider OP neurocognitive evaluation if memory loss persists  -d/w ED attending    OP referral options:  •	[facility]  o	[email or web address]  o	Three locations  ?	[address] [place] [address]  Phone: [phone]  ?	[address] [facility] [address] [place] [address]  Phone: [phone]  ?	[address] [facility] [address] [place] [address]  Phone: [phone]  o	Medicare, Medicaid, most private insurance and sliding scale  •	[facility] [place]  o	[email or web address]  o	[address]  [place], [place] [address] [phone] ([phone] for intakes)  o	Medicare, Medicaid, most private insurance (including United)  •	[facility]  o	[email or web address]  o	[address] [place] [address]   [phone]  o	Medicare, Medicaid, Aetna, Affinity, Amida Care, BCBS, Emblem Health, Bayron, GHI, Healthfirst, HealthPlus, MetroPlus, Wellcare  •	[facility]  o	[email or web address]  o	[address] [place] [address]  [phone]  Intake hours for new patients: Tuesdays and Thursdays at 8am  o	Medicare, Medicaid, most private insurance  •	[facility]  o	[email or web address]  o	[address] [place]  [address]  [phone]  o	Medicare, Medicaid, AARP, Aetna, Affinity, AmeriChoice, [employer], CenterLight, Cigna, Emblem Health, BCBS, Bayron, GHI, HealthFirst, HIP, Optum, Weld, United, ValueOptions  •	[facility]  o	[email or web address]  o	[address] [place] [address]  (corner of [address] and [facility]  Telephone: [phone]  o	Medicare, Medicaid, most private insurance  •	[facility]  o	[email or web address]  o	Two locations  ?	[address] ([address])  [place] [address]  [phone]  ?	[address] [place] [address]  [phone]

## 2023-11-03 NOTE — ED BEHAVIORAL HEALTH ASSESSMENT NOTE - DIFFERENTIAL
adjustment d/o, r/o depression/anxiety 2/2 a general medical condition  mdd in remission by history  r/o EUSEBIO adjustment d/o, r/o mdd recurrent episode, r/o depression/anxiety 2/2 a general medical condition  mdd by history  r/o EUSEBIO  r/o minor neurocognitive d/o

## 2023-11-03 NOTE — ED ADULT NURSE NOTE - NSFALLUNIVINTERV_ED_ALL_ED
Bed/Stretcher in lowest position, wheels locked, appropriate side rails in place/Call bell, personal items and telephone in reach/Instruct patient to call for assistance before getting out of bed/chair/stretcher/Non-slip footwear applied when patient is off stretcher/[place] to call system/Physically safe environment - no spills, clutter or unnecessary equipment/Purposeful proactive rounding/Room/bathroom lighting operational, light cord in reach

## 2023-11-03 NOTE — ED BEHAVIORAL HEALTH ASSESSMENT NOTE - NSSUICPROTFACT_PSY_ALL_CORE
Responsibility to children, family, or others/Identifies reasons for living/Supportive social network of family or friends/Fear of death or the actual act of killing self/Cultural, spiritual and/or moral attitudes against suicide/Uatsdin beliefs

## 2023-11-03 NOTE — ED PROVIDER NOTE - NSFOLLOWUPINSTRUCTIONS_ED_ALL_ED_FT
Fue atendido en el Departamento de Emergencias por: dolor abdominal, presión arterial ayush.    Twin un seguimiento con yarbrough médico de cabecera y cardiólogo layo se indicó. Si no tiene un médico de atención primaria o un especialista que se adapte a dorian necesidades, llame al [phone] para encontrar bella que le resulte conveniente. En gerard número podrá localizar un proveedor que acepte yarbrough seguro, así layo localizar al especialista adecuado para dorian necesidades.    Debe regresar al Departamento de Emergencias si siente algún síntoma nuevo/que empeora/persistente, incluidos, entre otros: dolor en el pecho, dificultad para respirar, pérdida del conocimiento, sangrado, dolor incontrolado, entumecimiento/debilidad de dennis parte del cuerpo.    -----------------------------------------------------------------------------------------------------------    You were seen in the Emergency Department for: abdominal pain, high blood pressure    Please follow up with your primary physician and cardiologist as discussed. If you do not have a primary physician or specialist of your needs, please call [phone] to find one convenient for you. At this number you will be able to locate a provider who accepts your insurance, as well as locate the right specialist for your needs.    You should return to the Emergency Department if you feel any new/worsening/persistent symptoms including but not limited to: chest pain, difficulty breathing, loss of consciousness, bleeding, uncontrolled pain, numbness/weakness of a body part

## 2023-11-03 NOTE — ED PROVIDER NOTE - LANGUAGE ASSISTANCE NEEDED
Patient Will Remove Sutures At Home?: No Body Location Override (Optional - Billing Will Still Be Based On Selected Body Map Location If Applicable): left mid back Punch Size In Mm: 8 Was A Bandage Applied: Yes Anesthesia Volume In Cc: 0.5 Hemostasis: None Additional Anesthesia Volume In Cc (Will Not Render If 0): 0 Yes-Patient/Caregiver accepts free interpretation services... Consent was obtained and risks were reviewed including but not limited to scarring, infection, bleeding, scabbing, incomplete removal, nerve damage and allergy to anesthesia. Suture Removal: 7 days Post-Care Instructions: I reviewed with the patient in detail post-care instructions. Patient is to keep the biopsy site dry overnight, and then apply bacitracin twice daily until healed. Patient may apply hydrogen peroxide soaks to remove any crusting. Wound Care: Vaseline Dressing: pressure dressing Biopsy Type: H and E Home Suture Removal Text: Patient was provided a home suture removal kit and will remove their sutures at home.  If they have any questions or difficulties they will call the office. Detail Level: Detailed Billing Type: Third-Party Bill Anesthesia Type: 1% lidocaine with epinephrine Epidermal Sutures: 3-0 Ethilon Notification Instructions: Patient will be notified of biopsy results. However, patient instructed to call the office if not contacted within 2 weeks.

## 2023-11-06 DIAGNOSIS — R00.1 BRADYCARDIA, UNSPECIFIED: ICD-10-CM

## 2023-11-06 DIAGNOSIS — T50.906A UNDERDOSING OF UNSPECIFIED DRUGS, MEDICAMENTS AND BIOLOGICAL SUBSTANCES, INITIAL ENCOUNTER: ICD-10-CM

## 2023-11-06 DIAGNOSIS — Z91.148 PATIENT'S OTHER NONCOMPLIANCE WITH MEDICATION REGIMEN FOR OTHER REASON: ICD-10-CM

## 2023-11-06 DIAGNOSIS — E03.9 HYPOTHYROIDISM, UNSPECIFIED: ICD-10-CM

## 2023-11-06 DIAGNOSIS — Z79.82 LONG TERM (CURRENT) USE OF ASPIRIN: ICD-10-CM

## 2023-11-06 DIAGNOSIS — Z91.09 OTHER ALLERGY STATUS, OTHER THAN TO DRUGS AND BIOLOGICAL SUBSTANCES: ICD-10-CM

## 2023-11-06 DIAGNOSIS — R10.13 EPIGASTRIC PAIN: ICD-10-CM

## 2023-11-06 DIAGNOSIS — Z63.4 DISAPPEARANCE AND DEATH OF FAMILY MEMBER: ICD-10-CM

## 2023-11-06 DIAGNOSIS — I25.10 ATHEROSCLEROTIC HEART DISEASE OF NATIVE CORONARY ARTERY WITHOUT ANGINA PECTORIS: ICD-10-CM

## 2023-11-06 DIAGNOSIS — E78.5 HYPERLIPIDEMIA, UNSPECIFIED: ICD-10-CM

## 2023-11-06 DIAGNOSIS — F32.A DEPRESSION, UNSPECIFIED: ICD-10-CM

## 2023-11-06 DIAGNOSIS — I10 ESSENTIAL (PRIMARY) HYPERTENSION: ICD-10-CM

## 2023-11-06 DIAGNOSIS — F43.23 ADJUSTMENT DISORDER WITH MIXED ANXIETY AND DEPRESSED MOOD: ICD-10-CM

## 2023-11-06 SDOH — SOCIAL STABILITY - SOCIAL INSECURITY: DISSAPEARANCE AND DEATH OF FAMILY MEMBER: Z63.4

## 2024-01-22 ENCOUNTER — EMERGENCY (EMERGENCY)
Facility: HOSPITAL | Age: 84
LOS: 1 days | Discharge: HOME | End: 2024-01-22
Attending: STUDENT IN AN ORGANIZED HEALTH CARE EDUCATION/TRAINING PROGRAM | Admitting: STUDENT IN AN ORGANIZED HEALTH CARE EDUCATION/TRAINING PROGRAM
Payer: COMMERCIAL

## 2024-01-22 VITALS
RESPIRATION RATE: 16 BRPM | WEIGHT: 123.9 LBS | OXYGEN SATURATION: 98 % | TEMPERATURE: 98 F | SYSTOLIC BLOOD PRESSURE: 173 MMHG | HEIGHT: 60 IN | HEART RATE: 69 BPM | DIASTOLIC BLOOD PRESSURE: 79 MMHG

## 2024-01-22 DIAGNOSIS — Z98.89 OTHER SPECIFIED POSTPROCEDURAL STATES: Chronic | ICD-10-CM

## 2024-01-22 DIAGNOSIS — I10 ESSENTIAL (PRIMARY) HYPERTENSION: ICD-10-CM

## 2024-01-22 DIAGNOSIS — Z79.82 LONG TERM (CURRENT) USE OF ASPIRIN: ICD-10-CM

## 2024-01-22 DIAGNOSIS — Z95.5 PRESENCE OF CORONARY ANGIOPLASTY IMPLANT AND GRAFT: ICD-10-CM

## 2024-01-22 DIAGNOSIS — I25.10 ATHEROSCLEROTIC HEART DISEASE OF NATIVE CORONARY ARTERY WITHOUT ANGINA PECTORIS: ICD-10-CM

## 2024-01-22 DIAGNOSIS — Z91.09 OTHER ALLERGY STATUS, OTHER THAN TO DRUGS AND BIOLOGICAL SUBSTANCES: ICD-10-CM

## 2024-01-22 DIAGNOSIS — R04.0 EPISTAXIS: ICD-10-CM

## 2024-01-22 DIAGNOSIS — E78.5 HYPERLIPIDEMIA, UNSPECIFIED: ICD-10-CM

## 2024-01-22 PROCEDURE — 99284 EMERGENCY DEPT VISIT MOD MDM: CPT

## 2024-01-22 RX ORDER — TRANEXAMIC ACID 100 MG/ML
5 INJECTION, SOLUTION INTRAVENOUS ONCE
Refills: 0 | Status: COMPLETED | OUTPATIENT
Start: 2024-01-22 | End: 2024-01-22

## 2024-01-22 RX ORDER — OXYMETAZOLINE HYDROCHLORIDE 0.5 MG/ML
2 SPRAY NASAL ONCE
Refills: 0 | Status: COMPLETED | OUTPATIENT
Start: 2024-01-22 | End: 2024-01-22

## 2024-01-22 NOTE — ED ADULT NURSE NOTE - NSFALLUNIVINTERV_ED_ALL_ED
Bed/Stretcher in lowest position, wheels locked, appropriate side rails in place/Call bell, personal items and telephone in reach/Instruct patient to call for assistance before getting out of bed/chair/stretcher/Non-slip footwear applied when patient is off stretcher/[place] to call system/Physically safe environment - no spills, clutter or unnecessary equipment/Purposeful proactive rounding/Room/bathroom lighting operational, light cord in reach

## 2024-01-22 NOTE — ED ADULT NURSE NOTE - OBJECTIVE STATEMENT
Pt A&Ox4 and able to speak in complete sentences. Pt breathing even and unlabored with equal chest rise and fall. Pt arrived d/t nosebleed over the past couple hours. Pt endorsed nosebleed was spontaneous. Pt nosebleed no controlled but still having nasal drip in which patient now presents with coughing up blood. Pt denies n, v, lightheadedness, dizziness, sob, fevers, chills, cp. Pt endorsed taking HTN medications. Pt denies taking blood thinners, pt stopped taking aspirin five days ago for a scheduled glaucoma surgery,

## 2024-01-23 VITALS
TEMPERATURE: 97 F | OXYGEN SATURATION: 95 % | DIASTOLIC BLOOD PRESSURE: 75 MMHG | SYSTOLIC BLOOD PRESSURE: 185 MMHG | RESPIRATION RATE: 16 BRPM | HEART RATE: 58 BPM

## 2024-01-23 LAB
ANION GAP SERPL CALC-SCNC: 10 MMOL/L — SIGNIFICANT CHANGE UP (ref 5–17)
BASOPHILS # BLD AUTO: 0.08 K/UL — SIGNIFICANT CHANGE UP (ref 0–0.2)
BASOPHILS NFR BLD AUTO: 1 % — SIGNIFICANT CHANGE UP (ref 0–2)
BUN SERPL-MCNC: 31 MG/DL — HIGH (ref 7–23)
CALCIUM SERPL-MCNC: 10.3 MG/DL — SIGNIFICANT CHANGE UP (ref 8.4–10.5)
CHLORIDE SERPL-SCNC: 106 MMOL/L — SIGNIFICANT CHANGE UP (ref 96–108)
CO2 SERPL-SCNC: 28 MMOL/L — SIGNIFICANT CHANGE UP (ref 22–31)
CREAT SERPL-MCNC: 0.59 MG/DL — SIGNIFICANT CHANGE UP (ref 0.5–1.3)
EGFR: 90 ML/MIN/1.73M2 — SIGNIFICANT CHANGE UP
EOSINOPHIL # BLD AUTO: 0.34 K/UL — SIGNIFICANT CHANGE UP (ref 0–0.5)
EOSINOPHIL NFR BLD AUTO: 4.3 % — SIGNIFICANT CHANGE UP (ref 0–6)
GLUCOSE SERPL-MCNC: 123 MG/DL — HIGH (ref 70–99)
HCT VFR BLD CALC: 45.6 % — HIGH (ref 34.5–45)
HGB BLD-MCNC: 15.7 G/DL — HIGH (ref 11.5–15.5)
IMM GRANULOCYTES NFR BLD AUTO: 0.3 % — SIGNIFICANT CHANGE UP (ref 0–0.9)
LYMPHOCYTES # BLD AUTO: 1.81 K/UL — SIGNIFICANT CHANGE UP (ref 1–3.3)
LYMPHOCYTES # BLD AUTO: 23.1 % — SIGNIFICANT CHANGE UP (ref 13–44)
MCHC RBC-ENTMCNC: 33.7 PG — SIGNIFICANT CHANGE UP (ref 27–34)
MCHC RBC-ENTMCNC: 34.4 GM/DL — SIGNIFICANT CHANGE UP (ref 32–36)
MCV RBC AUTO: 97.9 FL — SIGNIFICANT CHANGE UP (ref 80–100)
MONOCYTES # BLD AUTO: 0.57 K/UL — SIGNIFICANT CHANGE UP (ref 0–0.9)
MONOCYTES NFR BLD AUTO: 7.3 % — SIGNIFICANT CHANGE UP (ref 2–14)
NEUTROPHILS # BLD AUTO: 5 K/UL — SIGNIFICANT CHANGE UP (ref 1.8–7.4)
NEUTROPHILS NFR BLD AUTO: 64 % — SIGNIFICANT CHANGE UP (ref 43–77)
NRBC # BLD: 0 /100 WBCS — SIGNIFICANT CHANGE UP (ref 0–0)
PLATELET # BLD AUTO: 222 K/UL — SIGNIFICANT CHANGE UP (ref 150–400)
POTASSIUM SERPL-MCNC: 4.3 MMOL/L — SIGNIFICANT CHANGE UP (ref 3.5–5.3)
POTASSIUM SERPL-SCNC: 4.3 MMOL/L — SIGNIFICANT CHANGE UP (ref 3.5–5.3)
RBC # BLD: 4.66 M/UL — SIGNIFICANT CHANGE UP (ref 3.8–5.2)
RBC # FLD: 16.7 % — HIGH (ref 10.3–14.5)
SODIUM SERPL-SCNC: 144 MMOL/L — SIGNIFICANT CHANGE UP (ref 135–145)
WBC # BLD: 7.82 K/UL — SIGNIFICANT CHANGE UP (ref 3.8–10.5)
WBC # FLD AUTO: 7.82 K/UL — SIGNIFICANT CHANGE UP (ref 3.8–10.5)

## 2024-01-23 PROCEDURE — 30901 CONTROL OF NOSEBLEED: CPT | Mod: RT

## 2024-01-23 PROCEDURE — 36415 COLL VENOUS BLD VENIPUNCTURE: CPT

## 2024-01-23 PROCEDURE — 80048 BASIC METABOLIC PNL TOTAL CA: CPT

## 2024-01-23 PROCEDURE — 99284 EMERGENCY DEPT VISIT MOD MDM: CPT | Mod: 25

## 2024-01-23 PROCEDURE — 85025 COMPLETE CBC W/AUTO DIFF WBC: CPT

## 2024-01-23 RX ORDER — OXYCODONE HYDROCHLORIDE 5 MG/1
5 TABLET ORAL ONCE
Refills: 0 | Status: DISCONTINUED | OUTPATIENT
Start: 2024-01-23 | End: 2024-01-23

## 2024-01-23 RX ORDER — TRANEXAMIC ACID 100 MG/ML
5 INJECTION, SOLUTION INTRAVENOUS ONCE
Refills: 0 | Status: COMPLETED | OUTPATIENT
Start: 2024-01-23 | End: 2024-01-23

## 2024-01-23 RX ORDER — ACETAMINOPHEN 500 MG
2 TABLET ORAL
Qty: 42 | Refills: 0
Start: 2024-01-23 | End: 2024-01-29

## 2024-01-23 RX ORDER — ACETAMINOPHEN 500 MG
650 TABLET ORAL ONCE
Refills: 0 | Status: COMPLETED | OUTPATIENT
Start: 2024-01-23 | End: 2024-01-23

## 2024-01-23 RX ADMIN — TRANEXAMIC ACID 5 MILLILITER(S): 100 INJECTION, SOLUTION INTRAVENOUS at 00:54

## 2024-01-23 RX ADMIN — OXYCODONE HYDROCHLORIDE 5 MILLIGRAM(S): 5 TABLET ORAL at 03:55

## 2024-01-23 RX ADMIN — OXYMETAZOLINE HYDROCHLORIDE 2 SPRAY(S): 0.5 SPRAY NASAL at 00:53

## 2024-01-23 RX ADMIN — TRANEXAMIC ACID 5 MILLILITER(S): 100 INJECTION, SOLUTION INTRAVENOUS at 00:50

## 2024-01-23 RX ADMIN — Medication 650 MILLIGRAM(S): at 02:11

## 2024-01-23 NOTE — ED PROVIDER NOTE - NSFOLLOWUPCLINICS_GEN_ALL_ED_FT
[address] [place], [place] [address]ist   Progress Note    Admitting Date and Time: 10/8/2020  3:15 PM  Admit Dx: Type I diabetes mellitus with hyperosmolar coma ([facility] [address] [E10.69, E10.65]    Subjective:    10/9: Pt feels sore in right lower back. She does not have much of appetite. She tells me saw endocrinologist end of September and he changed her from bid Lantus to Lantus in AM and NPH at night(as she does peritoneal dialysis at night). She feels like she has UTI.     10/10: Patient still having stomach cramping after eating and diarrhea but that has been ongoing issue for here. 10/11: Patient states not feeling well today. Sore all over and appetite improved somewhat.     Per RN: hypoglycemia this morning treated with amp of D50       vancomycin (VANCOCIN) intermittent dosing (placeholder)   Other RX Placeholder    insulin lispro  0-6 Units Subcutaneous TID WC    insulin lispro  0-3 Units Subcutaneous Nightly    epoetin nena-epbx  8,000 Units Subcutaneous Once per day on Mon Wed Fri    insulin glargine  10 Units Subcutaneous QAM    insulin NPH  5 Units Subcutaneous Nightly    insulin lispro  3 Units Subcutaneous TID WC    sodium chloride flush  10 mL Intravenous 2 times per day    atorvastatin  40 mg Oral Nightly    bumetanide  2 mg Oral BID    dilTIAZem  240 mg Oral Daily    gabapentin  100 mg Oral TID    levothyroxine  50 mcg Oral Daily    lisinopril  20 mg Oral Daily    metOLazone  5 mg Oral Daily    metoprolol  100 mg Oral BID    sodium chloride flush  10 mL Intravenous 2 times per day    heparin (porcine)  5,000 Units Subcutaneous 3 times per day     HYDROcodone 5 mg - acetaminophen, 1 tablet, Q4H PRN    Or  HYDROcodone 5 mg - acetaminophen, 2 tablet, Q4H PRN  sodium chloride flush, 10 mL, PRN  acetaminophen, 650 mg, Q4H PRN  cloNIDine, 0.1 mg, BID PRN  LORazepam, 0.5 mg, BID PRN  sodium chloride flush, 10 mL, PRN  acetaminophen, 650 mg, Q6H PRN    Or  acetaminophen, 650 mg, Q6H Source of Blood Culture  No site given        Order Number  [id number]        Enterobacter cloacae complex by PCR  Not Detected        Escherichia coli by PCR  Not Detected        Klebsiella oxytoca by PCR  Not Detected        Klebsiella pneumoniae by PCR  Not Detected        Proteus by PCR  Not Detected        Streptococcus agalactiae by PCR  Not Detected        Staphylococcus aureus by PCR  Not Detected        Serratia marcescens by PCR  Not Detected        Streptococcus pneumoniae by PCR  Not Detected        Streptococcus pyogenes  by PCR  Not Detected        Acinetobacter baumannii by PCR  Not Detected        Candida albicans by PCR  Not Detected        Candida glabrata by PCR  Not Detected        Candida krusei by PCR  Not Detected        Candida parapsilosis by PCR  Not Detected        Candida tropicalis by PCR  Not Detected        Enterobacteriaceae by PCR  Not Detected        Enterococcus by PCR  Not Detected        Haemophilus Influenzae by PCR  Not Detected        Listeria monocytogenes by PCR  Not Detected        Neisseria meningitidis by PCR  Not Detected        Pseudomonas aeruginosa by PCR  Not Detected        Staphylococcus by PCR  DETECTED        Streptococcus by PCR  Not Detected        Methicillin Resistant by PCR  DETECTED       Narrative:         CALL  Walker  H6SJ tel. ,   Microbiology results called to and read back by The Mckay King rn, 10/10/2020   12:28, by Khushbu Kwok         Culture, Blood 2 [[id number]] (Abnormal)  Collected: 10/08/20 1835       Specimen: Blood  Updated: 10/11/20 0746        Culture, Blood 2  --        Gram stain performed from blood culture bottle media   Gram positive cocci in clusters           Organism  Staphylococcus species        Culture, Blood 2  Identification and sensitivity to follow       Narrative:         CALL  Walker  H6SJ tel. ,   Previous panic on this admission - call not needed per SOP, 10/10/2020 12:34,   by Khushbu Kwok       Culture, Blood 1 [[id number]] (Abnormal) Collected: 10/08/20 1820       Specimen: Blood  Updated: 10/11/20 0740        Blood Culture, Routine  --        Gram stain performed from blood culture bottle media   Gram positive cocci in clusters           Organism  Staphylococcus species        Blood Culture, Routine  Identification and sensitivity to follow       Narrative:         CALL  Walker  H6SJ tel. ,   Microbiology results called to and read back by The Mckay King rn, 10/10/2020   12:34, by Lowell Medina         Culture, Urine [[id number]] (Abnormal)   Collected: 10/08/20 1526       Specimen: Urine, clean catch  Updated: 10/10/20 0712        Organism  Enterococcus faecalis        Urine Culture, Routine  --        50,000 CFU/ml   Identification and sensitivity to follow       Narrative:         Source: URINE       Site:        Radiology:   XR CHEST PORTABLE   Final Result   No evidence of pneumonia or pleural effusion. Assessment:  Principal Problem:    Uncontrolled type 1 diabetes mellitus with hyperglycemia, with long-term current use of insulin ([facility])  Active Problems:    ESRD on peritoneal dialysis ([facility] [address]    Severe protein-calorie malnutrition ([facility] [address]    Acute cystitis without hematuria    Hypertension    Hypothyroidism    Hyperlipidemia  Resolved Problems:    * No resolved hospital problems. *      Plan:    1. Uncontrolled type I DM with hyperglycemia as well as hypoglycemia--she did received Lantus in ER as well as higher dose of Lantus this morning at 15 units. I placed back on regimen of Lantus 10 units in AM, NPH 5 units at night and Novolog 3 units with meals plus SSI. Apparently, she is on low dose SSI at home and here we ordered medium dose. Yesterday held SSI for today and start low dose today. Given morning hypoglycemia, will hold evening NPH.   See excerpt from Dr. Selam Cope note:   · Patient's diabetes is uncontrol complicated with both macro and microvascular complications   · Will change DM regimen to Lantus 10 units daily in am, Novolog 3 units with meals + sliding scale 1:50>150, NPH 5 units at night to cover peritoneal dialysis   · The patient was advised to continue checking blood sugars 4 times a day before meals and at bedtime and send BS readings to our office in a week. · Will order insulin pump and CGM to help with better DM control   · Patient will need routine diabetes maintenance and prevention  · The patient was referred to diabetic educator for further teaching on carb counting   2. UTI--patient given ceftriaxone 1g in ER. Urine Cx showing GPC and I suspected  Enterococcus (this was confirmed on Cx; it was not VRE). I treated with one dose of Fosfomycin 10/9.  3. Staph bacteremia--started  Vancomycin to be adjusted for PD. Pharm D consulted. ID consulted appreciated. 4. Low back pain-heating pad and Norco prn.  5. ESRD on PD--gets nightly PD treatment. Nephrology consulted and following. Case discussed with nurse on the floor. Case discussed with Dr Beth Edouard of nephrology earlier today. NOTE: This report was transcribed using voice recognition software. Every effort was made to ensure accuracy; however, inadvertent computerized transcription errors may be present.      Electronically signed by Mello Toussaint MD on 10/11/2020 at 5:07 PM              \ [facility]  Otolaryngology (ENT)  [address], [address]  [place] [address]  Phone: [phone]  Fax:     [facility]  Otolaryngology (ENT)  [address] [facility] [place] [address]  Phone: [phone]  Fax:

## 2024-01-23 NOTE — ED PROVIDER NOTE - PATIENT PORTAL LINK FT
You can access the FollowMyHealth Patient Portal offered by [facility] by registering at the following website: [email or web address]. By joining [employer]’s FollowMyHealth portal, you will also be able to view your health information using other applications (apps) compatible with our system.

## 2024-01-23 NOTE — ED PROVIDER NOTE - PHYSICAL EXAMINATION
CONST: nontoxic NAD speaking in full sentences  HEAD: atraumatic  EYES: conjunctivae clear  NECK: supple  CARD: regular rate  ENMT: +active epistaxis from R nare, no epistaxis from L nare, + blood dripping down posterior oropharynx  CHEST: breathing comfortably, no stridor/retractions/tripoding  EXT: FROM  SKIN: warm, dry  NEURO: awake alert answering questions following commands moving all extremities

## 2024-01-23 NOTE — ED PROVIDER NOTE - CLINICAL SUMMARY MEDICAL DECISION MAKING FREE TEXT BOX
VS w/ hypertension here  Noted w/ epistaxis from R nare, L nare clear, on oropharyngeal exam able to see blood dripping down throat, pt has kidney basin and spitting up blood and clots  -->Gave afrin followed by txa soaked rhinorocket 5.5cm w/o control of epistaxis, swapped out for txa soaked rhinorocket 7.5cm w/ improvement in epistaxis however still has blood dripping down back of throat and spitting up clots. Will call ENT for further management of epistaxis VS w/ hypertension here  Noted w/ epistaxis from R nare, L nare clear, on oropharyngeal exam able to see blood dripping down throat, pt has kidney basin and spitting up blood and clots  -->Gave afrin followed by txa soaked rhinorocket 5.5cm w/o control of epistaxis, swapped out for txa soaked rhinorocket 7.5cm w/ improvement in epistaxis however still has blood dripping down back of throat and spitting up clots. Will call ENT for further management of epistaxis  --->Discussed w/ ENT resident who says observe for 1-2 hours since rhinorocket insertion, monitor how much blood she is spitting up (says often it is old blood and resolves in 1-2 hours). Will sign out to night team pending reassessment at 330AM, if still spitting up blood or recurrent epistaxis, will call ENT resident back. Informed pt and daughter to save basins of blood so we can accurately monitor.

## 2024-01-23 NOTE — ED PROVIDER NOTE - CARE PROVIDER_API CALL
Trista Pfeiffer  Otolaryngology  [address]  [place] [address]  Phone: [phone]  Fax: [phone]  Follow Up Time:

## 2024-01-23 NOTE — ED PROVIDER NOTE - OBJECTIVE STATEMENT
83yF w/ HTN HLD CAD stents on aspirin (hasnt taken it in 5 days in preparation for glaucoma surgery this Thursday) comes in for epistaxis to R nare. States 1 week ago had some type of scope done by GI where they put the scope down the R nostril to look at the throat. Was fine after procedure. Today began havign epistaxis from R nare, also feels the blood going down her throat and is spitting up clots. Denies foreign body in nose, nose picking prior to onset of epistaxis.

## 2024-01-23 NOTE — ED PROVIDER NOTE - PROGRESS NOTE DETAILS
courtney - received on sign out pending reevaluation. on reeval @330 pt has some blood in basin however notes that it stopped bleeding 30 mins ago.   given another 30 mins and no further bleeding, no spitting up blood. oropharynx w/o blood.   ok for dc w packing in place. will dc on augmentin. and close ent follow up.    All results reviewed with the patient verbally. Discharge plan and return precautions d/w pt who verbalized understanding and agrees with plan. All questions answered. Vitals WNL. Ready for d/c.

## 2024-01-23 NOTE — ED PROVIDER NOTE - NSFOLLOWUPINSTRUCTIONS_ED_ALL_ED_FT
PACKING - Keep the nasal packing in your nostril for 3-5 days. Take antibiotic (AUGMENTIN) twice a day for 5 days. Complete entire course of antibiotics. Take tylenol for pain. Follow up with an ENT doctor to have the packing removed in 3-5 days. Return to the Emergency Department for persistent, worsening or new symptoms including persistent nose bleed, bleeding from any other site on the body, lightheadedness/dizziness/syncope, high fever/chills, nausea/vomiting, or any other serious concerns.    ___      EMBALAJE: Mantenga el taponamiento nasal en [place] fosa nasal kar 3 a 5 días. [place] antibiótico (AUGMENTIN) dos veces al día kar 5 días. Completar todo el ciclo de antibióticos. [place] tylenol para el dolor. Twin un seguimiento con un otorrinolaringólogo para que le retiren el empaquetamiento en 3 a 5 días. Regrese al Departamento de Emergencias si presenta síntomas persistentes, que empeoran o nuevos, incluido sangrado nasal persistente, sangrado de cualquier otro lugar del cuerpo, aturdimiento/mareos/síncope, fiebre ayush/escalofríos, náuseas/vómitos o cualquier otra inquietud grave.

## 2024-03-19 NOTE — ED BEHAVIORAL HEALTH ASSESSMENT NOTE - DESCRIPTION
,[email or web address] from [facility]. , has two adult daughters, lives with daughter in [facility] in [place]. Raised children, denied prior work outside of the home HTN, HLD, hypothyroidism, CAD Pt BIBS, calm and cooperative with evaluation. Per triage note, "Pt walked into ED c/o multiple medical issues. pt states "I had a thyroid test 2 weeks ago that I havent gotten the results of, my blood pressure is up, i'm having a lot of stomach pain, and I've had such bad anxiety I can barely sleep." Denies CP, SOB, fevers, multiple medical complaints" Pt BIBS, calm and cooperative with evaluation. Per triage note, "Pt walked into ED c/o multiple medical issues. pt states "I had a thyroid test 2 weeks ago that I havent gotten the results of, my blood pressure is up, i'm having a lot of stomach pain, and I've had such bad anxiety I can barely sleep." Denies CP, SOB, fevers, multiple medical complaints"    labs reviewed - CBC WNL, TSH WNL

## 2024-06-11 NOTE — ED PROVIDER NOTE - PMH
2024    TELEHEALTH EVALUATION -- Audio/Visual    HPI:    Nelson Hebert (:  1974) has requested an audio/video evaluation for the following concern(s):    Hypertension and dental problem.  History of Present Illness  The patient presents via virtual visit for evaluation of multiple medical concerns.    The patient's blood pressure has shown significant improvement, with readings ranging from 124/79 to 129/84. She is currently on a regimen of Diovan and hydrochlorothiazide, with a two-month supply remaining.    The patient is on a daily regimen of metformin, administered nightly. However, she does not monitor her blood sugar levels at home. She recalls a two-week hiatus of metformin, during which her initial dosage was lowered. She is uncertain if the current dosage is effective.    The patient underwent a filling procedure last week, during which a dark spot was discovered on her x-ray. A layer of medication was applied to the area, and she was advised to return for a roof canal procedure if she experiences pain. Following this, she reported no issues. However, on  night, she experienced severe dental pain, which she attributes to teeth grinding and pressure. She managed the pain with 1000 mg of Tylenol, which provided temporary relief until approximately 3:00 PM. She contacted her dentist, who suspected a dental infection. She was prescribed clindamycin, which she took twice yesterday and once today. Her pain has since improved.    Supplemental Information  She is taking vitamin D 2000 a day.    Review of Systems   Constitutional: Negative.    HENT: Negative.          Toothache   Eyes: Negative.    Respiratory: Negative.     Cardiovascular: Negative.    Gastrointestinal: Negative.    Endocrine: Negative.    Genitourinary: Negative.    Musculoskeletal: Negative.    Neurological: Negative.    Hematological: Negative.    Psychiatric/Behavioral: Negative.         Prior to Visit Medications  
CAD (coronary artery disease)    Glaucoma    Herniated disc, cervical    HLD (hyperlipidemia)    HTN (hypertension)    Hypothyroid

## 2024-08-11 NOTE — ED PROVIDER NOTE - NSFOLLOWUPINSTRUCTIONS_ED_ALL_ED_FT
No. Can take tylenol 650mg every 6hrs as needed for pain.  Can take valium as prescribed for more severe pain.  Follow up with primary doctor within 1-2 days.  Return to ER immediately for fevers, persistent vomit, uncontrolled pain, incontinence, focal weakness/numbness, worsening dizziness.   Follow up with spine specialist for persistent pain.   Can call [phone] to schedule appointment or go online [email or web address]  Follow up with neurologist. Can call [phone] to schedule appointment.     Back Pain    Back pain is very common in adults. The cause of back pain is rarely dangerous and the pain often gets better over time. The cause of your back pain may not be known and may include strain of muscles or ligaments, degeneration of the spinal disks, or arthritis. Occasionally the pain may radiate down your leg(s). Over-the-counter medicines to reduce pain and inflammation are often the most helpful. Stretching and remaining active frequently helps the healing process.     SEEK IMMEDIATE MEDICAL CARE IF YOU HAVE ANY OF THE FOLLOWING SYMPTOMS: bowel or bladder control problems, unusual weakness or numbness in your arms or legs, nausea or vomiting, abdominal pain, fever, dizziness/lightheadedness.